# Patient Record
Sex: MALE | Race: WHITE | Employment: FULL TIME | URBAN - METROPOLITAN AREA
[De-identification: names, ages, dates, MRNs, and addresses within clinical notes are randomized per-mention and may not be internally consistent; named-entity substitution may affect disease eponyms.]

---

## 2020-03-22 ENCOUNTER — HOSPITAL ENCOUNTER (EMERGENCY)
Age: 40
Discharge: HOME | End: 2020-03-22
Payer: COMMERCIAL

## 2020-03-22 VITALS
TEMPERATURE: 98 F | HEART RATE: 78 BPM | SYSTOLIC BLOOD PRESSURE: 132 MMHG | HEIGHT: 73 IN | DIASTOLIC BLOOD PRESSURE: 83 MMHG | OXYGEN SATURATION: 99 % | BODY MASS INDEX: 20.6 KG/M2 | RESPIRATION RATE: 16 BRPM | WEIGHT: 155.42 LBS

## 2020-03-22 PROCEDURE — 99282 EMERGENCY DEPT VISIT SF MDM: CPT

## 2020-03-22 PROCEDURE — 6370000000 HC RX 637 (ALT 250 FOR IP): Performed by: PHYSICIAN ASSISTANT

## 2020-03-22 RX ORDER — OXYCODONE HYDROCHLORIDE AND ACETAMINOPHEN 5; 325 MG/1; MG/1
1 TABLET ORAL ONCE
Status: COMPLETED | OUTPATIENT
Start: 2020-03-22 | End: 2020-03-22

## 2020-03-22 RX ORDER — OXYCODONE HYDROCHLORIDE AND ACETAMINOPHEN 5; 325 MG/1; MG/1
1 TABLET ORAL EVERY 6 HOURS PRN
Qty: 10 TABLET | Refills: 0 | Status: SHIPPED | OUTPATIENT
Start: 2020-03-22 | End: 2020-03-25

## 2020-03-22 RX ORDER — TETRACAINE HYDROCHLORIDE 5 MG/ML
1 SOLUTION OPHTHALMIC ONCE
Status: COMPLETED | OUTPATIENT
Start: 2020-03-22 | End: 2020-03-22

## 2020-03-22 RX ADMIN — OXYCODONE HYDROCHLORIDE AND ACETAMINOPHEN 1 TABLET: 5; 325 TABLET ORAL at 18:09

## 2020-03-22 RX ADMIN — TETRACAINE HYDROCHLORIDE 1 DROP: 5 SOLUTION OPHTHALMIC at 17:44

## 2020-03-22 RX ADMIN — FLUORESCEIN SODIUM 1 MG: 1 STRIP OPHTHALMIC at 17:44

## 2020-03-22 ASSESSMENT — ENCOUNTER SYMPTOMS
NAUSEA: 0
EYE DISCHARGE: 0
DIARRHEA: 0
PHOTOPHOBIA: 1
SHORTNESS OF BREATH: 0
ABDOMINAL PAIN: 0
EYE PAIN: 1
EYE ITCHING: 0
COUGH: 0
VOMITING: 0
EYE REDNESS: 1
BACK PAIN: 0

## 2020-03-22 ASSESSMENT — VISUAL ACUITY
OU: 1
OU: 20/25
OD: 20/20
OS: 20/20

## 2020-03-22 ASSESSMENT — PAIN SCALES - GENERAL: PAINLEVEL_OUTOF10: 5

## 2020-03-22 NOTE — ED TRIAGE NOTES
Pt was trimming his trees today and got some sawdust in his right eye.  He went to urgent care for treatmnet but no relief

## 2020-03-22 NOTE — ED PROVIDER NOTES
[facility] [facility] ENCOUNTER        Pt Name: Tomy Mcleod  MRN: [id number]  [place] 1980  Date of evaluation: 3/22/2020  Provider: JORDAN Zavaleta  PCP: Bo Adams MD    Evaluation by NIRAJ. My supervising physician was available for consultation. CHIEF COMPLAINT       Chief Complaint   Patient presents with    Eye Problem     Pt was trimming his trees today and got some sawdust in his right eye. He went to urgent care for treatmnet but no relief       HISTORY OF PRESENT ILLNESS   (Location, Timing/Onset, Context/Setting, Quality, Duration, Modifying Factors, Severity, Associated Signs and Symptoms)  Note limiting factors. Tomy Mcleod is a 46 y.o. male who presents to the emergency department for evaluation of right eye pain. Earlier this afternoon the patient was trimming trees and got some sawdust or piece of bark into his right eye. He was rubbing at his eye. Patient already has tobramycin at home which was prescribed by urgent care visit prior to ED visit. Continued to feel pain so he went to an urgent care where his eye was irrigated and they used tetracaine to numb the eye and were unable to visualize a foreign body. States that he continued to feel pain so he comes to the emergency department for further evaluation. He does not wear contact lenses or glasses. Pain is severe. Worse with opening eyes and blinking. Denies disturbance with visual acuity. No bleeding drainage or wound. No headache, nausea or vomiting. Nursing Notes were all reviewed and agreed with or any disagreements were addressed in the HPI. REVIEW OF SYSTEMS    (2-9 systems for level 4, 10 or more for level 5)     Review of Systems   Constitutional: Negative for chills, fatigue and fever. Eyes: Positive for photophobia, pain and redness. Negative for discharge, itching and visual disturbance.    Respiratory: Negative for cough and shortness of eveningHistorical Med      ferrous sulfate 325 (65 FE) MG tablet Take 325 mg by mouth daily (with breakfast). Historical Med      B Complex Vitamins (VITAMIN B COMPLEX) TABS Take 1 tablet by mouth daily. Historical Med      losartan (COZAAR) 50 MG tablet Take 100 mg by mouth daily. Historical Med      amlodipine (NORVASC) 5 MG tablet daily. Historical Med               ALLERGIES     Ambien [zolpidem tartrate]; Clonidine derivatives; Darvocet [propoxyphene n-acetaminophen]; and Percocet [oxycodone-acetaminophen]    FAMILYHISTORY       Family History   Problem Relation Age of Onset    High Blood Pressure Mother     Diabetes Maternal Grandmother     Cancer Paternal Grandmother         leukemia          SOCIAL HISTORY       Social History     Tobacco Use    Smoking status: Never Smoker    Smokeless tobacco: Never Used   Substance Use Topics    Alcohol use: Yes     Comment: 6 drinks a month    Drug use: No       SCREENINGS             PHYSICAL EXAM    (up to 7 for level 4, 8 or more for level 5)     ED Triage Vitals [03/22/20 1741]   BP Temp Temp Source Pulse Resp SpO2 Height Weight   132/83 98 °F (36.7 °C) Oral 78 16 99 % 6' 1\" (1.854 m) 155 lb 6.8 oz (70.5 kg)       Physical Exam  Vitals signs and nursing note reviewed. Constitutional:       General: He is not in acute distress. Appearance: He is well-developed. He is not diaphoretic. HENT:      Head: Normocephalic and atraumatic. Eyes:      General: Lids are everted, no foreign bodies appreciated. Vision grossly intact. Right eye: No foreign body or discharge. Left eye: No discharge. Extraocular Movements: Extraocular movements intact. Conjunctiva/sclera:      Right eye: Right conjunctiva is injected. Comments: Immediate relief of pain after tetracaine. Fluorescein exam reveals corneal abrasion in 12 o'clock position with Woods lamp. No ulcer. Neck:      Musculoskeletal: Normal range of motion and neck supple. Pulmonary:      Effort: No respiratory distress. Breath sounds: No stridor. Musculoskeletal: Normal range of motion. Skin:     General: Skin is warm and dry. Coloration: Skin is not pale. Neurological:      Mental Status: He is alert and oriented to person, place, and time. Comments: No gross facial drooping. Moves all 4 extremities spontaneously. Psychiatric:         Behavior: Behavior normal.         DIAGNOSTIC RESULTS   LABS:    Labs Reviewed - No data to display    All other labs were within normal range or not returned as of this dictation. EKG: All EKG's are interpreted by the Emergency Department Physician in the absence of a cardiologist.  Please see their note for interpretation of EKG. RADIOLOGY:   Non-plain film images such as CT, Ultrasound and MRI are read by the radiologist. Plain radiographic images are visualized and preliminarily interpreted by the ED Provider with the below findings:        Interpretation per the Radiologist below, if available at the time of this note:    No orders to display     No results found.         PROCEDURES   Unless otherwise noted below, none     Procedures    CRITICAL CARE TIME   N/A    CONSULTS:  None      EMERGENCY DEPARTMENT COURSE and DIFFERENTIAL DIAGNOSIS/MDM:   Vitals:    Vitals:    03/22/20 1741   BP: 132/83   Pulse: 78   Resp: 16   Temp: 98 °F (36.7 °C)   TempSrc: Oral   SpO2: 99%   Weight: 155 lb 6.8 oz (70.5 kg)   Height: 6' 1\" (1.854 m)       Patient was given the following medications:  Medications   tetracaine (TETRAVISC) 0.5 % ophthalmic solution 1 drop (1 drop Ophthalmic Given 3/22/20 1744)   fluorescein ophthalmic strip 1 mg (1 mg Ophthalmic Given 3/22/20 1744)   oxyCODONE-acetaminophen (PERCOCET) 5-325 MG per tablet 1 tablet (1 tablet Oral Given 3/22/20 1809)       Differential diagnosis: Iritis, Uveitis, Conjunctivitis, Herpes Keratitis, Corneal Ulcer, Corneal Abrasion, Acute Angle Glaucoma, Orbital Cellulitis/Abscess, up to 3 days. WARNING:  May cause drowsiness. May impair ability to operate vehicles or machinery.   Do not use in combination with alcohol., Disp-10 tablet, R-0Print             DISCONTINUED MEDICATIONS:  Discharge Medication List as of 3/22/2020  6:10 PM                 (Please note that portions of this note were completed with a voice recognition program.  Efforts were made to edit the dictations but occasionally words are mis-transcribed.)    JORDAN Smith (electronically signed)            JORDAN Smith  03/22/20 1934